# Patient Record
Sex: FEMALE | Race: WHITE | NOT HISPANIC OR LATINO | Employment: PART TIME | URBAN - NONMETROPOLITAN AREA
[De-identification: names, ages, dates, MRNs, and addresses within clinical notes are randomized per-mention and may not be internally consistent; named-entity substitution may affect disease eponyms.]

---

## 2022-05-06 ENCOUNTER — APPOINTMENT (EMERGENCY)
Dept: RADIOLOGY | Facility: HOSPITAL | Age: 57
End: 2022-05-06
Payer: COMMERCIAL

## 2022-05-06 ENCOUNTER — HOSPITAL ENCOUNTER (EMERGENCY)
Facility: HOSPITAL | Age: 57
Discharge: HOME | End: 2022-05-06
Attending: EMERGENCY MEDICINE
Payer: COMMERCIAL

## 2022-05-06 VITALS
HEIGHT: 66 IN | RESPIRATION RATE: 19 BRPM | WEIGHT: 210 LBS | TEMPERATURE: 97.7 F | DIASTOLIC BLOOD PRESSURE: 75 MMHG | SYSTOLIC BLOOD PRESSURE: 137 MMHG | BODY MASS INDEX: 33.75 KG/M2 | OXYGEN SATURATION: 99 % | HEART RATE: 55 BPM

## 2022-05-06 DIAGNOSIS — S20.212A RIB CONTUSION, LEFT, INITIAL ENCOUNTER: Primary | ICD-10-CM

## 2022-05-06 PROCEDURE — 93005 ELECTROCARDIOGRAM TRACING: CPT

## 2022-05-06 PROCEDURE — 99284 EMERGENCY DEPT VISIT MOD MDM: CPT

## 2022-05-06 PROCEDURE — 99284 EMERGENCY DEPT VISIT MOD MDM: CPT | Performed by: EMERGENCY MEDICINE

## 2022-05-06 PROCEDURE — 71045 X-RAY EXAM CHEST 1 VIEW: CPT

## 2022-05-06 PROCEDURE — 71101 X-RAY EXAM UNILAT RIBS/CHEST: CPT

## 2022-05-06 PROCEDURE — 96372 THER/PROPH/DIAG INJ SC/IM: CPT

## 2022-05-06 RX ORDER — KETOROLAC TROMETHAMINE 30 MG/ML
30 INJECTION, SOLUTION INTRAMUSCULAR; INTRAVENOUS ONCE
Status: COMPLETED | OUTPATIENT
Start: 2022-05-06 | End: 2022-05-06

## 2022-05-06 RX ORDER — AMLODIPINE BESYLATE 5 MG/1
5 TABLET ORAL DAILY
COMMUNITY

## 2022-05-06 RX ORDER — ATORVASTATIN CALCIUM 40 MG/1
40 TABLET, FILM COATED ORAL DAILY
COMMUNITY

## 2022-05-06 RX ORDER — LISINOPRIL 20 MG/1
20 TABLET ORAL 2 TIMES DAILY
COMMUNITY

## 2022-05-06 RX ORDER — IBUPROFEN 600 MG/1
600 TABLET ORAL EVERY 6 HOURS PRN
Qty: 30 TABLET | Refills: 0 | Status: SHIPPED | OUTPATIENT
Start: 2022-05-06

## 2022-05-06 RX ADMIN — KETOROLAC TROMETHAMINE 30 MG: 30 INJECTION, SOLUTION INTRAMUSCULAR at 12:38

## 2022-05-06 NOTE — ED PROVIDER NOTES
History  Chief Complaint   Patient presents with    Rib Injury     pt states she was standing up in bathtub on tuesday when she slipped falling on clawfoot tub landing on left rib cage  pt balbir left rib pain     Patient is a 51-year-old female who slipped while getting out of her bathtub 3 days ago  She landed on her left side of her ribcage  She has had persistent pain in area since then  There is mild ecchymosis along her left rib cage left breast   Did not strike her head  Does not taking any anticoagulation  No difficulty breathing or shortness of breath  Pain is worsened with movement and direct palpation  No diaphoresis  No nausea or vomiting  No headache  No neck pain  Patient is here with her   Prior to Admission Medications   Prescriptions Last Dose Informant Patient Reported? Taking? amLODIPine (NORVASC) 5 mg tablet 2022 at Unknown time  Yes Yes   Sig: Take 5 mg by mouth daily   atorvastatin (LIPITOR) 40 mg tablet 2022 at Unknown time  Yes Yes   Sig: Take 40 mg by mouth daily   lisinopril (ZESTRIL) 20 mg tablet 2022 at Unknown time  Yes Yes   Sig: Take 20 mg by mouth 2 (two) times a day      Facility-Administered Medications: None       Past Medical History:   Diagnosis Date    Hyperlipidemia     Hypertension        Past Surgical History:   Procedure Laterality Date    APPENDECTOMY       SECTION      HERNIA REPAIR      TUBAL LIGATION         History reviewed  No pertinent family history  I have reviewed and agree with the history as documented      E-Cigarette/Vaping    E-Cigarette Use Never User      E-Cigarette/Vaping Substances     Social History     Tobacco Use    Smoking status: Never Smoker    Smokeless tobacco: Never Used   Vaping Use    Vaping Use: Never used   Substance Use Topics    Alcohol use: Yes     Comment: socially    Drug use: Never       Review of Systems   Constitutional: Negative for appetite change, chills, fatigue, fever and unexpected weight change  HENT: Negative for congestion, ear pain, rhinorrhea and sore throat  Eyes: Negative for pain and visual disturbance  Respiratory: Negative for cough, chest tightness, shortness of breath and wheezing  Cardiovascular: Negative for chest pain, palpitations and leg swelling  Gastrointestinal: Negative for abdominal pain, constipation, diarrhea, nausea and vomiting  Genitourinary: Negative for difficulty urinating, dysuria, frequency, hematuria, menstrual problem, pelvic pain, vaginal bleeding and vaginal discharge  Musculoskeletal: Negative for arthralgias, back pain and neck pain  Skin: Negative for color change and rash  Neurological: Negative for dizziness, seizures, syncope, light-headedness and headaches  Psychiatric/Behavioral: Negative for confusion and sleep disturbance  All other systems reviewed and are negative  Physical Exam  Physical Exam  Vitals and nursing note reviewed  Constitutional:       General: She is not in acute distress  Appearance: Normal appearance  She is well-developed and normal weight  She is not ill-appearing, toxic-appearing or diaphoretic  HENT:      Head: Normocephalic and atraumatic  Nose: Nose normal       Mouth/Throat:      Mouth: Mucous membranes are moist       Pharynx: Oropharynx is clear  Eyes:      General: No scleral icterus  Extraocular Movements: Extraocular movements intact  Conjunctiva/sclera: Conjunctivae normal    Cardiovascular:      Rate and Rhythm: Normal rate and regular rhythm  Pulses: Normal pulses  Heart sounds: Normal heart sounds  No murmur heard  No friction rub  No gallop  Pulmonary:      Effort: Pulmonary effort is normal  No respiratory distress  Breath sounds: Normal breath sounds  No wheezing or rales  Abdominal:      Palpations: Abdomen is soft  There is no mass  Tenderness: There is no abdominal tenderness   There is no right CVA tenderness, left CVA tenderness, guarding or rebound  Hernia: No hernia is present  Musculoskeletal:         General: Tenderness and signs of injury present  Normal range of motion  Arms:       Cervical back: Normal range of motion and neck supple  Right lower leg: No edema  Left lower leg: No edema  Skin:     General: Skin is warm and dry  Coloration: Skin is not pale  Findings: No lesion  Neurological:      General: No focal deficit present  Mental Status: She is alert and oriented to person, place, and time  Psychiatric:         Mood and Affect: Mood normal          Behavior: Behavior normal          Vital Signs  ED Triage Vitals   Temperature Pulse Respirations Blood Pressure SpO2   05/06/22 1111 05/06/22 1111 05/06/22 1108 05/06/22 1111 05/06/22 1111   97 7 °F (36 5 °C) (!) 54 18 149/77 100 %      Temp Source Heart Rate Source Patient Position - Orthostatic VS BP Location FiO2 (%)   05/06/22 1111 05/06/22 1111 05/06/22 1111 05/06/22 1111 --   Temporal Monitor Sitting Right arm       Pain Score       05/06/22 1108       9           Vitals:    05/06/22 1200 05/06/22 1230 05/06/22 1300 05/06/22 1330   BP: 123/72 109/71 160/93 137/75   Pulse: 57 56 (!) 53 55   Patient Position - Orthostatic VS: Sitting Sitting Sitting Sitting         Visual Acuity      ED Medications  Medications   ketorolac (TORADOL) injection 30 mg (30 mg Intramuscular Given 5/6/22 1238)       Diagnostic Studies  Results Reviewed     None                 XR ribs with pa chest min 3 views LEFT   Final Result by Radha Davis MD (05/06 1522)      No active cardiopulmonary disease  No evidence of rib fractures  Workstation performed: [id number]         XR chest 1 view portable   Final Result by Radha Davis MD (05/06 1522)      No acute cardiopulmonary disease                    Workstation performed: [id number]                    Procedures  ECG 12 Lead Documentation Only    Date/Time: 5/6/2022 3:28 PM  Performed by: Valery Dominguez DO  Authorized by: Valery Dominguez DO     Indications / Diagnosis:  Rib injury  ECG reviewed by me, the ED Provider: yes    Patient location:  ED  Rate:     ECG rate:  52    ECG rate assessment: bradycardic    Rhythm:     Rhythm: sinus rhythm    Ectopy:     Ectopy: none    QRS:     QRS axis:  Normal    QRS intervals:  Normal  Conduction:     Conduction: abnormal      Abnormal conduction: LAFB    ST segments:     ST segments:  Normal  T waves:     T waves: non-specific               ED Course                               SBIRT 20yo+      Most Recent Value   SBIRT (24 yo +)    In order to provide better care to our patients, we are screening all of our patients for alcohol and drug use  Would it be okay to ask you these screening questions? Yes Filed at: 05/06/2022 1137   Initial Alcohol Screen: US AUDIT-C     1  How often do you have a drink containing alcohol? 0 Filed at: 05/06/2022 1137   2  How many drinks containing alcohol do you have on a typical day you are drinking? 0 Filed at: 05/06/2022 1137   3a  Male UNDER 65: How often do you have five or more drinks on one occasion? 0 Filed at: 05/06/2022 1137   3b  FEMALE Any Age, or MALE 65+: How often do you have 4 or more drinks on one occassion? 0 Filed at: 05/06/2022 1137   Audit-C Score 0 Filed at: 05/06/2022 1137   MUNIR: How many times in the past year have you    Used an illegal drug or used a prescription medication for non-medical reasons?  Never Filed at: 05/06/2022 1137                    MDM    Disposition  Final diagnoses:   Rib contusion, left, initial encounter     Time reflects when diagnosis was documented in both MDM as applicable and the Disposition within this note     Time User Action Codes Description Comment    5/6/2022  1:33 PM Aurther Dancer T Add [S20 212A] Rib contusion, left, initial encounter     5/6/2022  1:33 PM Natalie Sanchez T Add [S22 32XA] Closed fracture of one rib of left side, initial encounter     5/6/2022  3:59 PM Josephine Jean Remove [S22 32XA] Closed fracture of one rib of left side, initial encounter       ED Disposition     ED Disposition Condition Date/Time Comment    Discharge Stable Fri May 6, 2022  1:33 PM Lynda Roopa discharge to home/self care  Follow-up Information     Follow up With Specialties Details Why [address], [place] Internal Medicine Schedule an appointment as soon as possible for a visit   [address]  [phone]            Discharge Medication List as of 5/6/2022  1:34 PM      START taking these medications    Details   ibuprofen (MOTRIN) 600 mg tablet Take 1 tablet (600 mg total) by mouth every 6 (six) hours as needed for mild pain, Starting Fri 5/6/2022, Normal         CONTINUE these medications which have NOT CHANGED    Details   amLODIPine (NORVASC) 5 mg tablet Take 5 mg by mouth daily, Historical Med      atorvastatin (LIPITOR) 40 mg tablet Take 40 mg by mouth daily, Historical Med      lisinopril (ZESTRIL) 20 mg tablet Take 20 mg by mouth 2 (two) times a day, Historical Med             No discharge procedures on file      PDMP Review     None          ED Provider  Electronically Signed by           Vinicio Wallace DO  05/06/22 1600

## 2022-05-06 NOTE — ED NOTES
Patient transported to X-ray via [facility], RN  05/06/22 2330

## 2022-05-11 LAB
ATRIAL RATE: 52 BPM
P AXIS: 31 DEGREES
PR INTERVAL: 166 MS
QRS AXIS: -47 DEGREES
QRSD INTERVAL: 102 MS
QT INTERVAL: 472 MS
QTC INTERVAL: 438 MS
T WAVE AXIS: 37 DEGREES
VENTRICULAR RATE: 52 BPM

## 2022-05-11 PROCEDURE — 93010 ELECTROCARDIOGRAM REPORT: CPT | Performed by: INTERNAL MEDICINE
